# Patient Record
Sex: FEMALE | URBAN - METROPOLITAN AREA
[De-identification: names, ages, dates, MRNs, and addresses within clinical notes are randomized per-mention and may not be internally consistent; named-entity substitution may affect disease eponyms.]

---

## 2021-03-03 ENCOUNTER — OFFICE VISIT (OUTPATIENT)
Dept: URBAN - METROPOLITAN AREA CLINIC 96 | Facility: CLINIC | Age: 81
End: 2021-03-03

## 2021-03-09 ENCOUNTER — LAB OUTSIDE AN ENCOUNTER (OUTPATIENT)
Dept: URBAN - METROPOLITAN AREA CLINIC 96 | Facility: CLINIC | Age: 81
End: 2021-03-09

## 2021-03-10 ENCOUNTER — OFFICE VISIT (OUTPATIENT)
Dept: URBAN - METROPOLITAN AREA CLINIC 96 | Facility: CLINIC | Age: 81
End: 2021-03-10

## 2021-03-10 RX ORDER — SODIUM, POTASSIUM,MAG SULFATES 17.5-3.13G
SOLUTION, RECONSTITUTED, ORAL ORAL AS DIRECTED
Qty: 177 MILLILITER | Refills: 0 | OUTPATIENT
Start: 2021-03-09 | End: 2021-03-10

## 2021-03-10 RX ORDER — ONDANSETRON HYDROCHLORIDE 4 MG/1
1 TABLET AS NEEDED TABLET, FILM COATED ORAL ONCE A DAY
Qty: 2 | Refills: 0 | OUTPATIENT
Start: 2021-03-09

## 2021-03-10 NOTE — HPI-OTHER HISTORIES
81 yo female referred by Dr Mary Jane Olson for colon cancer screening and a copy of this note will be sent to the referring physician.

## 2021-05-03 ENCOUNTER — LAB OUTSIDE AN ENCOUNTER (OUTPATIENT)
Dept: URBAN - METROPOLITAN AREA CLINIC 96 | Facility: CLINIC | Age: 81
End: 2021-05-03

## 2021-05-04 ENCOUNTER — OFFICE VISIT (OUTPATIENT)
Dept: URBAN - METROPOLITAN AREA CLINIC 96 | Facility: CLINIC | Age: 81
End: 2021-05-04

## 2021-05-04 RX ORDER — ONDANSETRON HYDROCHLORIDE 4 MG/1
1 TABLET AS NEEDED TABLET, FILM COATED ORAL ONCE A DAY
Qty: 2 | Refills: 0 | Status: ACTIVE | COMMUNITY
Start: 2021-03-09

## 2021-05-04 NOTE — HPI-TODAY'S VISIT:
79 yo female referred by Dr Olson for colon cancer screening and a copy of this note will be sent to the referring physician.

## 2021-05-06 ENCOUNTER — OFFICE VISIT (OUTPATIENT)
Dept: URBAN - METROPOLITAN AREA CLINIC 92 | Facility: CLINIC | Age: 81
End: 2021-05-06
Payer: MEDICARE

## 2021-05-06 VITALS
SYSTOLIC BLOOD PRESSURE: 135 MMHG | WEIGHT: 140 LBS | DIASTOLIC BLOOD PRESSURE: 68 MMHG | BODY MASS INDEX: 23.32 KG/M2 | HEIGHT: 65 IN | HEART RATE: 59 BPM | TEMPERATURE: 97 F

## 2021-05-06 DIAGNOSIS — I10 HYPERTENSION, UNSPECIFIED TYPE: ICD-10-CM

## 2021-05-06 DIAGNOSIS — Z12.11 COLON CANCER SCREENING: ICD-10-CM

## 2021-05-06 PROBLEM — 38341003: Status: ACTIVE | Noted: 2021-05-06

## 2021-05-06 PROCEDURE — 99204 OFFICE O/P NEW MOD 45 MIN: CPT | Performed by: INTERNAL MEDICINE

## 2021-05-06 RX ORDER — ONDANSETRON HYDROCHLORIDE 4 MG/1
1 TABLET AS NEEDED TABLET, FILM COATED ORAL ONCE A DAY
Qty: 2 | Refills: 0 | OUTPATIENT
Start: 2021-05-06

## 2021-05-06 RX ORDER — ONDANSETRON HYDROCHLORIDE 4 MG/1
1 TABLET AS NEEDED TABLET, FILM COATED ORAL ONCE A DAY
Qty: 2 | Refills: 0 | Status: ON HOLD | COMMUNITY
Start: 2021-03-09

## 2021-05-06 RX ORDER — POLYETHYLENE GLYCOL 3350, SODIUM SULFATE, SODIUM CHLORIDE, POTASSIUM CHLORIDE, ASCORBIC ACID, SODIUM ASCORBATE 7.5-2.691G
AS DIRECTED KIT ORAL
Qty: 2 LITER | Refills: 0 | OUTPATIENT
Start: 2021-05-06 | End: 2021-05-07

## 2021-05-06 NOTE — HPI-TODAY'S VISIT:
This is a 81 yo female referred by Dr Olson for a GI consultation for colon cancer screening. Pt had a colonoscopy 10 yrs ago and it was normal. Pts  here too. Pt moves her bowels everyday. Pt denies GERD or anemia. Pt has hypertension. Pt working on exercise eats healthy.

## 2021-05-07 PROBLEM — 305058001: Status: ACTIVE | Noted: 2021-05-07

## 2021-07-16 ENCOUNTER — ERX REFILL RESPONSE (OUTPATIENT)
Dept: URBAN - METROPOLITAN AREA CLINIC 92 | Facility: CLINIC | Age: 81
End: 2021-07-16

## 2021-07-16 RX ORDER — POLYETHYLENE GLYCOL 3350, SODIUM CHLORIDE, SODIUM BICARBONATE, POTASSIUM CHLORIDE 420; 11.2; 5.72; 1.48 G/4L; G/4L; G/4L; G/4L
AS DIRECTED POWDER, FOR SOLUTION ORAL AS DIRECTED
Qty: 4 LITER | Refills: 0 | OUTPATIENT

## 2021-08-05 ENCOUNTER — CLAIMS CREATED FROM THE CLAIM WINDOW (OUTPATIENT)
Dept: URBAN - METROPOLITAN AREA CLINIC 4 | Facility: CLINIC | Age: 81
End: 2021-08-05
Payer: MEDICARE

## 2021-08-05 ENCOUNTER — OFFICE VISIT (OUTPATIENT)
Dept: URBAN - METROPOLITAN AREA SURGERY CENTER 16 | Facility: SURGERY CENTER | Age: 81
End: 2021-08-05
Payer: MEDICARE

## 2021-08-05 DIAGNOSIS — D12.2 BENIGN NEOPLASM OF ASCENDING COLON: ICD-10-CM

## 2021-08-05 DIAGNOSIS — D12.4 ADENOMA OF DESCENDING COLON: ICD-10-CM

## 2021-08-05 DIAGNOSIS — D12.2 ADENOMA OF ASCENDING COLON: ICD-10-CM

## 2021-08-05 DIAGNOSIS — K63.89 BACTERIAL OVERGROWTH SYNDROME: ICD-10-CM

## 2021-08-05 DIAGNOSIS — D12.4 BENIGN NEOPLASM OF DESCENDING COLON: ICD-10-CM

## 2021-08-05 DIAGNOSIS — K63.89 COLONIC PSEUDOMELANOSIS: ICD-10-CM

## 2021-08-05 DIAGNOSIS — Z12.11 COLON CANCER SCREENING: ICD-10-CM

## 2021-08-05 PROCEDURE — 88305 TISSUE EXAM BY PATHOLOGIST: CPT | Performed by: PATHOLOGY

## 2021-08-05 PROCEDURE — 45380 COLONOSCOPY AND BIOPSY: CPT | Performed by: INTERNAL MEDICINE

## 2021-08-05 PROCEDURE — G8907 PT DOC NO EVENTS ON DISCHARG: HCPCS | Performed by: INTERNAL MEDICINE

## 2021-08-13 ENCOUNTER — TELEPHONE ENCOUNTER (OUTPATIENT)
Dept: URBAN - METROPOLITAN AREA CLINIC 92 | Facility: CLINIC | Age: 81
End: 2021-08-13

## 2021-08-16 ENCOUNTER — OUT OF OFFICE VISIT (OUTPATIENT)
Dept: URBAN - METROPOLITAN AREA MEDICAL CENTER 31 | Facility: MEDICAL CENTER | Age: 81
End: 2021-08-16
Payer: MEDICARE

## 2021-08-16 DIAGNOSIS — R11.2 ACUTE NAUSEA WITH NONBILIOUS VOMITING: ICD-10-CM

## 2021-08-16 DIAGNOSIS — D72.829 ELEVATED WBC COUNT: ICD-10-CM

## 2021-08-16 DIAGNOSIS — K59.09 CHRONIC CONSTIPATION: ICD-10-CM

## 2021-08-16 DIAGNOSIS — R10.84 ABDOMINAL CRAMPING, GENERALIZED: ICD-10-CM

## 2021-08-16 DIAGNOSIS — K65.1 ABDOMINAL VISCERAL ABSCESS: ICD-10-CM

## 2021-08-16 DIAGNOSIS — R93.3 ABN FINDINGS-GI TRACT: ICD-10-CM

## 2021-08-16 PROCEDURE — G8427 DOCREV CUR MEDS BY ELIG CLIN: HCPCS | Performed by: INTERNAL MEDICINE

## 2021-08-16 PROCEDURE — 99222 1ST HOSP IP/OBS MODERATE 55: CPT | Performed by: INTERNAL MEDICINE

## 2021-08-16 PROCEDURE — 99232 SBSQ HOSP IP/OBS MODERATE 35: CPT | Performed by: INTERNAL MEDICINE

## 2021-08-18 ENCOUNTER — TELEPHONE ENCOUNTER (OUTPATIENT)
Dept: URBAN - METROPOLITAN AREA CLINIC 92 | Facility: CLINIC | Age: 81
End: 2021-08-18

## 2021-08-24 ENCOUNTER — TELEPHONE ENCOUNTER (OUTPATIENT)
Dept: URBAN - METROPOLITAN AREA CLINIC 92 | Facility: CLINIC | Age: 81
End: 2021-08-24

## 2021-08-25 ENCOUNTER — TELEPHONE ENCOUNTER (OUTPATIENT)
Dept: URBAN - METROPOLITAN AREA CLINIC 92 | Facility: CLINIC | Age: 81
End: 2021-08-25

## 2021-09-12 ENCOUNTER — LAB OUTSIDE AN ENCOUNTER (OUTPATIENT)
Dept: URBAN - METROPOLITAN AREA CLINIC 124 | Facility: CLINIC | Age: 81
End: 2021-09-12

## 2021-09-13 ENCOUNTER — OFFICE VISIT (OUTPATIENT)
Dept: URBAN - METROPOLITAN AREA CLINIC 124 | Facility: CLINIC | Age: 81
End: 2021-09-13
Payer: MEDICARE

## 2021-09-13 VITALS
BODY MASS INDEX: 21.99 KG/M2 | WEIGHT: 132 LBS | DIASTOLIC BLOOD PRESSURE: 69 MMHG | HEIGHT: 65 IN | SYSTOLIC BLOOD PRESSURE: 105 MMHG | TEMPERATURE: 97.9 F | HEART RATE: 56 BPM

## 2021-09-13 DIAGNOSIS — Z90.710 H/O HYSTERECTOMY FOR BENIGN DISEASE: ICD-10-CM

## 2021-09-13 DIAGNOSIS — K57.90 DIVERTICULOSIS: ICD-10-CM

## 2021-09-13 DIAGNOSIS — K57.92 DIVERTICULITIS: ICD-10-CM

## 2021-09-13 DIAGNOSIS — Z86.010 PERSONAL HISTORY OF COLONIC POLYPS: ICD-10-CM

## 2021-09-13 PROCEDURE — 99204 OFFICE O/P NEW MOD 45 MIN: CPT | Performed by: INTERNAL MEDICINE

## 2021-09-13 RX ORDER — POLYETHYLENE GLYCOL 3350, SODIUM CHLORIDE, SODIUM BICARBONATE, POTASSIUM CHLORIDE 420; 11.2; 5.72; 1.48 G/4L; G/4L; G/4L; G/4L
AS DIRECTED POWDER, FOR SOLUTION ORAL AS DIRECTED
Qty: 4 LITER | Refills: 0 | COMMUNITY

## 2021-09-13 RX ORDER — ONDANSETRON HYDROCHLORIDE 4 MG/1
1 TABLET AS NEEDED TABLET, FILM COATED ORAL ONCE A DAY
Qty: 2 | Refills: 0 | COMMUNITY
Start: 2021-03-09

## 2021-09-13 RX ORDER — ONDANSETRON HYDROCHLORIDE 4 MG/1
1 TABLET AS NEEDED TABLET, FILM COATED ORAL ONCE A DAY
Qty: 2 | Refills: 0 | COMMUNITY
Start: 2021-05-06

## 2021-09-13 NOTE — HPI-TODAY'S VISIT:
This is a 81-year-old female accompanied by her  and referred by Dr. Olson for a GI visit after colonoscopy and after a hospitalization for diverticulitis and a copy this note be sent to the referring provider.  Patient had a colonoscopy at the [place] [facility] suite on August 5, 2021 for screening purposes.  This revealed internal hemorrhoids and multiple small large diverticula in the sigmoid, ascending and descending colon.  There was a 5 mm descending colon polyp that was removed with a cold biopsy, 2 sessile polyps in the ascending colon that were 4 to 5 mm removed with cold biopsy and a 5 mm sigmoid polyp that was also removed with cold biopsy.  Patient had external skin tag.  Pathology revealed tubular adenomas of the descending and ascending colon and a hyperplastic polyp of the sigmoid colon.  Patient started to have abdominal pain and did not feel well in mid August.  She went to [facility] for evaluation and was admitted on August 14, 2021 for evaluation of abdominal pain nausea and vomiting.  A CT scan done on August 14 showed a multiloculated, multiseptated fluid collection with air concerning for abscess adjacent to the sigmoid colon.  CT was repeated on August 15 with no significant change in the fluid collection most suggestive of an abscess.  Upon admission her white blood cell count was 16,000, it was thought that she may have had diverticulitis and radiology did not recommend drainage of this collection.  She was treated with IV Zosyn and bowel rest surgery was consulted as well as infectious disease and GI.  Patient was evaluated by general surgery who recommended conservative management as she was improving on a clear liquid diet.  Her pain was improving as well and her white blood cell count decreased to 12,000.  She had a follow-up CT scan that showed decrease in size of the fluid collection at 6 x 5.5 x 7.8 cm.  It was thought that the patient had a microperforated sigmoid colon diverticulitis which responded to medical therapy.  She was on IV Flagyl and Rocephin.  Her diet was to be advanced.  I did speak with the patient's  while she was in the hospital and he updated me on her progress.  She now presents for follow-up. Pts  is here with her. Pt has some ensure to help at least once a day. Pt is evacuating daily. Pt is on metronidazole still but off IV infustion. Pt has been on a full liquid diet.

## 2021-10-18 ENCOUNTER — OFFICE VISIT (OUTPATIENT)
Dept: URBAN - METROPOLITAN AREA CLINIC 124 | Facility: CLINIC | Age: 81
End: 2021-10-18

## 2021-10-25 ENCOUNTER — LAB OUTSIDE AN ENCOUNTER (OUTPATIENT)
Dept: URBAN - METROPOLITAN AREA CLINIC 124 | Facility: CLINIC | Age: 81
End: 2021-10-25

## 2021-10-25 ENCOUNTER — OFFICE VISIT (OUTPATIENT)
Dept: URBAN - METROPOLITAN AREA CLINIC 124 | Facility: CLINIC | Age: 81
End: 2021-10-25
Payer: MEDICARE

## 2021-10-25 VITALS
HEART RATE: 61 BPM | DIASTOLIC BLOOD PRESSURE: 63 MMHG | TEMPERATURE: 96.8 F | WEIGHT: 113.6 LBS | BODY MASS INDEX: 18.93 KG/M2 | SYSTOLIC BLOOD PRESSURE: 101 MMHG | HEIGHT: 65 IN

## 2021-10-25 DIAGNOSIS — Z90.710 H/O HYSTERECTOMY FOR BENIGN DISEASE: ICD-10-CM

## 2021-10-25 DIAGNOSIS — I50.9 ACUTE ON CHRONIC CONGESTIVE HEART FAILURE, UNSPECIFIED HEART FAILURE TYPE: ICD-10-CM

## 2021-10-25 DIAGNOSIS — K57.92 DIVERTICULITIS: ICD-10-CM

## 2021-10-25 DIAGNOSIS — Z86.010 PERSONAL HISTORY OF COLONIC POLYPS: ICD-10-CM

## 2021-10-25 DIAGNOSIS — K57.90 DIVERTICULOSIS: ICD-10-CM

## 2021-10-25 DIAGNOSIS — K21.9 GASTROESOPHAGEAL REFLUX DISEASE, UNSPECIFIED WHETHER ESOPHAGITIS PRESENT: ICD-10-CM

## 2021-10-25 PROCEDURE — 99204 OFFICE O/P NEW MOD 45 MIN: CPT | Performed by: INTERNAL MEDICINE

## 2021-10-25 RX ORDER — ONDANSETRON HYDROCHLORIDE 4 MG/1
1 TABLET AS NEEDED TABLET, FILM COATED ORAL ONCE A DAY
Qty: 2 | Refills: 0 | COMMUNITY
Start: 2021-03-09

## 2021-10-25 RX ORDER — ONDANSETRON HYDROCHLORIDE 4 MG/1
1 TABLET AS NEEDED TABLET, FILM COATED ORAL ONCE A DAY
Qty: 2 | Refills: 0 | COMMUNITY
Start: 2021-05-06

## 2021-10-25 RX ORDER — POLYETHYLENE GLYCOL 3350, SODIUM CHLORIDE, SODIUM BICARBONATE, POTASSIUM CHLORIDE 420; 11.2; 5.72; 1.48 G/4L; G/4L; G/4L; G/4L
AS DIRECTED POWDER, FOR SOLUTION ORAL AS DIRECTED
Qty: 4 LITER | Refills: 0 | COMMUNITY

## 2021-10-25 NOTE — HPI-TODAY'S VISIT:
This is a 81-year-old female accompanied by her  and referred by Dr. Olson for a GI visit ffu. Pt was seen a month ago for a f/u visit after colonoscopy and after a hospitalization for diverticulitis and a copy this note be sent to the referring provider.  Patient had a colonoscopy at the [place] end[facility] suite on August 5, 2021 for screening purposes.  This revealed internal hemorrhoids and multiple small large diverticula in the sigmoid, ascending and descending colon.  There was a 5 mm descending colon polyp that was removed with a cold biopsy, 2 sessile polyps in the ascending colon that were 4 to 5 mm removed with cold biopsy and a 5 mm sigmoid polyp that was also removed with cold biopsy.  Patient had external skin tag.  Pathology revealed tubular adenomas of the descending and ascending colon and a hyperplastic polyp of the sigmoid colon.  Patient started to have abdominal pain and did not feel well in mid August.  She went to [facility] for evaluation and was admitted on August 14, 2021 for evaluation of abdominal pain nausea and vomiting.  A CT scan done on August 14 showed a multiloculated, multiseptated fluid collection with air concerning for abscess adjacent to the sigmoid colon.  CT was repeated on August 15 with no significant change in the fluid collection most suggestive of an abscess.  Upon admission her white blood cell count was 16,000, it was thought that she may have had diverticulitis and radiology did not recommend drainage of this collection.  She was treated with IV Zosyn and bowel rest surgery was consulted as well as infectious disease and GI.  Patient was evaluated by general surgery who recommended conservative management as she was improving on a clear liquid diet.  Her pain was improving as well and her white blood cell count decreased to 12,000.  She had a follow-up CT scan that showed decrease in size of the fluid collection at 6 x 5.5 x 7.8 cm.  It was thought that the patient had a microperforated sigmoid colon diverticulitis which responded to medical therapy.  She was on IV Flagyl and Rocephin.  Her diet was to be advanced.  I did speak with the patient's  while she was in the hospital and he updated me on her progress.  She now presents for follow-up. Pts  is here with her. Pt has some ensure to help at least once a day. Pt is evacuating daily. Pt is on metronidazole still but off IV infustion. Pt had been on a full liquid diet and she was to increase slowly. Patient is here with her  for a GI follow-up.  It appears that she was seen at [facility] for CHF, dyspnea and failure to thrive.  Patient states that she was started on 3 new medications. Pt will be seeing a cardilogist soon. Pt was started on a prediabetic medication and water pill and K tablets. Pt had checkin in on Friday with Dr Olson. Pt is eating regular food but smal amounts. Pt saw an ENT and had a laryngoscopy that was negagive. Pt says she feels saliva is coming up from her stomach and somtimes it gags her. Pt is on panrtoprazole. Pt has this saliva coming up only on left side, Pt denies heartburn. Pt says she is ok with pills. Pt has no issues with swallowing .

## 2021-11-25 NOTE — PHYSICAL EXAM CHEST:
no lesions,  no deformities,  no traumatic injuries,  no significant scars are present,  chest wall non-tender,  no masses present, breathing is unlabored without accessory muscle use,normal breath sounds " chest , back ,pain, sob ,for a few days"

## 2022-03-16 ENCOUNTER — OFFICE VISIT (OUTPATIENT)
Dept: URBAN - METROPOLITAN AREA CLINIC 96 | Facility: CLINIC | Age: 82
End: 2022-03-16

## 2022-04-18 ENCOUNTER — OFFICE VISIT (OUTPATIENT)
Dept: URBAN - METROPOLITAN AREA CLINIC 124 | Facility: CLINIC | Age: 82
End: 2022-04-18

## 2022-04-18 ENCOUNTER — DASHBOARD ENCOUNTERS (OUTPATIENT)
Age: 82
End: 2022-04-18

## 2022-04-18 PROBLEM — 428283002: Status: ACTIVE | Noted: 2021-09-12

## 2022-04-18 PROBLEM — 428804004: Status: ACTIVE | Noted: 2021-09-12

## 2022-04-18 PROBLEM — 397881000: Status: ACTIVE | Noted: 2021-09-12

## 2022-04-18 PROBLEM — 235595009: Status: ACTIVE | Noted: 2021-10-25

## 2022-04-18 PROBLEM — 698296002: Status: ACTIVE | Noted: 2021-10-25

## 2022-04-18 PROBLEM — 42343007: Status: ACTIVE | Noted: 2022-04-18

## 2022-04-18 PROBLEM — 307496006: Status: ACTIVE | Noted: 2021-09-12

## 2022-04-18 RX ORDER — POLYETHYLENE GLYCOL 3350, SODIUM CHLORIDE, SODIUM BICARBONATE, POTASSIUM CHLORIDE 420; 11.2; 5.72; 1.48 G/4L; G/4L; G/4L; G/4L
AS DIRECTED POWDER, FOR SOLUTION ORAL AS DIRECTED
Qty: 4 LITER | Refills: 0 | COMMUNITY

## 2022-04-18 RX ORDER — ONDANSETRON HYDROCHLORIDE 4 MG/1
1 TABLET AS NEEDED TABLET, FILM COATED ORAL ONCE A DAY
Qty: 2 | Refills: 0 | COMMUNITY
Start: 2021-03-09

## 2022-04-18 NOTE — HPI-TODAY'S VISIT:
This is a 81-year-old female  referred by Dr. Olson for a GI visit fu. Pt was last seen in 10/2021 after a hospitalization for diverticulitis and a copy this note be sent to the referring provider.  Patient had a colonoscopy at the [place] endoscopy suite on August 5, 2021 for screening purposes.  This revealed internal hemorrhoids and multiple small large diverticula in the sigmoid, ascending and descending colon.  There was a 5 mm descending colon polyp that was removed with a cold biopsy, 2 sessile polyps in the ascending colon that were 4 to 5 mm removed with cold biopsy and a 5 mm sigmoid polyp that was also removed with cold biopsy.  Patient had external skin tag.  Pathology revealed tubular adenomas of the descending and ascending colon and a hyperplastic polyp of the sigmoid colon. Pt had diverticultis,  admitted on August 14, 2021 for evaluation of abdominal pain nausea and vomiting.  A CT scan done on August 14 showed a multiloculated, multiseptated fluid collection with air concerning for abscess adjacent to the sigmoid colon.  She had a follow-up CT scan that showed decrease in size of the fluid collection at 6 x 5.5 x 7.8 cm.  It was thought that the patient had a microperforated sigmoid colon diverticulitis which responded to medical therapy.

## 2025-01-03 ENCOUNTER — TELEPHONE ENCOUNTER (OUTPATIENT)
Dept: URBAN - METROPOLITAN AREA CLINIC 27 | Facility: CLINIC | Age: 85
End: 2025-01-03

## 2025-01-03 ENCOUNTER — OFFICE VISIT (OUTPATIENT)
Dept: URBAN - METROPOLITAN AREA CLINIC 84 | Facility: CLINIC | Age: 85
End: 2025-01-03
Payer: MEDICARE

## 2025-01-03 VITALS
WEIGHT: 131.4 LBS | DIASTOLIC BLOOD PRESSURE: 73 MMHG | TEMPERATURE: 97.2 F | BODY MASS INDEX: 21.89 KG/M2 | SYSTOLIC BLOOD PRESSURE: 135 MMHG | HEART RATE: 60 BPM | HEIGHT: 65 IN

## 2025-01-03 DIAGNOSIS — K56.699 COLON STRICTURE: ICD-10-CM

## 2025-01-03 DIAGNOSIS — Z95.0 S/P PLACEMENT OF CARDIAC PACEMAKER: ICD-10-CM

## 2025-01-03 DIAGNOSIS — Z86.0100 PERSONAL HISTORY OF COLONIC POLYPS: ICD-10-CM

## 2025-01-03 DIAGNOSIS — Z95.810 AICD (AUTOMATIC CARDIOVERTER/DEFIBRILLATOR) PRESENT: ICD-10-CM

## 2025-01-03 DIAGNOSIS — N82.4 COLOVAGINAL FISTULA: ICD-10-CM

## 2025-01-03 DIAGNOSIS — R93.3 ABNORMAL CT SCAN, COLON: ICD-10-CM

## 2025-01-03 DIAGNOSIS — K57.90 DIVERTICULAR DISEASE: ICD-10-CM

## 2025-01-03 PROBLEM — 397881000: Status: ACTIVE | Noted: 2025-01-03

## 2025-01-03 PROBLEM — 443325000: Status: ACTIVE | Noted: 2025-01-03

## 2025-01-03 PROBLEM — 235780005: Status: ACTIVE | Noted: 2025-01-03

## 2025-01-03 PROBLEM — 161692001: Status: ACTIVE | Noted: 2025-01-03

## 2025-01-03 PROCEDURE — 99204 OFFICE O/P NEW MOD 45 MIN: CPT | Performed by: INTERNAL MEDICINE

## 2025-01-03 RX ORDER — ONDANSETRON HYDROCHLORIDE 4 MG/1
1 TABLET AS NEEDED TABLET, FILM COATED ORAL ONCE A DAY
Qty: 2 | Refills: 0 | Status: ACTIVE | COMMUNITY
Start: 2021-05-06

## 2025-01-03 RX ORDER — POLYETHYLENE GLYCOL 3350, SODIUM CHLORIDE, SODIUM BICARBONATE, POTASSIUM CHLORIDE 420; 11.2; 5.72; 1.48 G/4L; G/4L; G/4L; G/4L
AS DIRECTED POWDER, FOR SOLUTION ORAL AS DIRECTED
Qty: 4 LITER | Refills: 0 | Status: ACTIVE | COMMUNITY

## 2025-01-03 NOTE — HPI-TODAY'S VISIT:
This patient was referred by Dr. Lesly Morales for an evaluation of diverticulitis.  A copy of this will be sent to the referring provider.  She was seen by Dr. Henderson on the referral of GYN due to a possible RVF.  SHe was having a vaginal d/c.  She denies anorexia or weight loss.  SHe denies abdominal pain.  She denies LGI ysmptoms.  She denies LGI Bled or melena.  She was recntly told that she ahs MARTY and she was started on Fe therapy.  Her stool has been black since starting this.  She denies UGI symptoms.  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  CT Scan shows wall thickening of the SC wall with barium in the vagina.  Radiology states that the wwall thickening is nonspecific and  could represent colitis vs malignancy.  Her last colonoscopy was in 2021.  She had multiple polyps and severe diverticulosis.

## 2025-01-15 ENCOUNTER — TELEPHONE ENCOUNTER (OUTPATIENT)
Dept: URBAN - METROPOLITAN AREA CLINIC 84 | Facility: CLINIC | Age: 85
End: 2025-01-15

## 2025-03-06 ENCOUNTER — OFFICE VISIT (OUTPATIENT)
Dept: URBAN - METROPOLITAN AREA MEDICAL CENTER 17 | Facility: MEDICAL CENTER | Age: 85
End: 2025-03-06
Payer: MEDICARE

## 2025-03-06 ENCOUNTER — TELEPHONE ENCOUNTER (OUTPATIENT)
Dept: URBAN - METROPOLITAN AREA CLINIC 84 | Facility: CLINIC | Age: 85
End: 2025-03-06

## 2025-03-06 DIAGNOSIS — R93.3 ABN FINDINGS-GI TRACT: ICD-10-CM

## 2025-03-06 DIAGNOSIS — D12.5 ADENOMA OF SIGMOID COLON: ICD-10-CM

## 2025-03-06 PROCEDURE — 45380 COLONOSCOPY AND BIOPSY: CPT | Performed by: INTERNAL MEDICINE

## 2025-03-06 RX ORDER — POLYETHYLENE GLYCOL 3350, SODIUM CHLORIDE, SODIUM BICARBONATE, POTASSIUM CHLORIDE 420; 11.2; 5.72; 1.48 G/4L; G/4L; G/4L; G/4L
AS DIRECTED POWDER, FOR SOLUTION ORAL AS DIRECTED
Qty: 4 LITER | Refills: 0 | Status: ACTIVE | COMMUNITY

## 2025-03-06 RX ORDER — ONDANSETRON HYDROCHLORIDE 4 MG/1
1 TABLET AS NEEDED TABLET, FILM COATED ORAL ONCE A DAY
Qty: 2 | Refills: 0 | Status: ACTIVE | COMMUNITY
Start: 2021-05-06